# Patient Record
Sex: FEMALE | Race: WHITE | NOT HISPANIC OR LATINO | URBAN - METROPOLITAN AREA
[De-identification: names, ages, dates, MRNs, and addresses within clinical notes are randomized per-mention and may not be internally consistent; named-entity substitution may affect disease eponyms.]

---

## 2019-05-08 ENCOUNTER — AMBULATORY - HEALTHEAST (OUTPATIENT)
Dept: MULTI SPECIALTY CLINIC | Facility: CLINIC | Age: 64
End: 2019-05-08

## 2019-08-07 ENCOUNTER — AMBULATORY - HEALTHEAST (OUTPATIENT)
Dept: MULTI SPECIALTY CLINIC | Facility: CLINIC | Age: 64
End: 2019-08-07

## 2019-11-07 ENCOUNTER — OFFICE VISIT - HEALTHEAST (OUTPATIENT)
Dept: FAMILY MEDICINE | Facility: CLINIC | Age: 64
End: 2019-11-07

## 2019-11-07 DIAGNOSIS — E78.5 DYSLIPIDEMIA: ICD-10-CM

## 2019-11-07 DIAGNOSIS — E66.813 CLASS 3 SEVERE OBESITY DUE TO EXCESS CALORIES WITH SERIOUS COMORBIDITY AND BODY MASS INDEX (BMI) OF 40.0 TO 44.9 IN ADULT (H): ICD-10-CM

## 2019-11-07 DIAGNOSIS — Z13.29 SCREENING FOR THYROID DISORDER: ICD-10-CM

## 2019-11-07 DIAGNOSIS — E55.9 VITAMIN D DEFICIENCY: ICD-10-CM

## 2019-11-07 DIAGNOSIS — E88.810 METABOLIC SYNDROME: ICD-10-CM

## 2019-11-07 DIAGNOSIS — E66.01 CLASS 3 SEVERE OBESITY DUE TO EXCESS CALORIES WITH SERIOUS COMORBIDITY AND BODY MASS INDEX (BMI) OF 40.0 TO 44.9 IN ADULT (H): ICD-10-CM

## 2019-11-07 DIAGNOSIS — R73.01 ELEVATED FASTING GLUCOSE: ICD-10-CM

## 2019-11-07 DIAGNOSIS — G47.33 OSA (OBSTRUCTIVE SLEEP APNEA): ICD-10-CM

## 2019-11-07 LAB
ALBUMIN SERPL-MCNC: 4 G/DL (ref 3.5–5)
ALP SERPL-CCNC: 147 U/L (ref 45–120)
ALT SERPL W P-5'-P-CCNC: 21 U/L (ref 0–45)
ANION GAP SERPL CALCULATED.3IONS-SCNC: 12 MMOL/L (ref 5–18)
AST SERPL W P-5'-P-CCNC: 16 U/L (ref 0–40)
BILIRUB SERPL-MCNC: 0.6 MG/DL (ref 0–1)
BUN SERPL-MCNC: 12 MG/DL (ref 8–22)
CALCIUM SERPL-MCNC: 11.1 MG/DL (ref 8.5–10.5)
CHLORIDE BLD-SCNC: 105 MMOL/L (ref 98–107)
CHOLEST SERPL-MCNC: 249 MG/DL
CO2 SERPL-SCNC: 23 MMOL/L (ref 22–31)
CREAT SERPL-MCNC: 0.83 MG/DL (ref 0.6–1.1)
FASTING STATUS PATIENT QL REPORTED: YES
GFR SERPL CREATININE-BSD FRML MDRD: >60 ML/MIN/1.73M2
GLUCOSE BLD-MCNC: 89 MG/DL (ref 70–125)
HBA1C MFR BLD: 5.8 % (ref 3.5–6)
HDLC SERPL-MCNC: 59 MG/DL
LDLC SERPL CALC-MCNC: 147 MG/DL
POTASSIUM BLD-SCNC: 4.6 MMOL/L (ref 3.5–5)
PROT SERPL-MCNC: 7.3 G/DL (ref 6–8)
SODIUM SERPL-SCNC: 140 MMOL/L (ref 136–145)
TRIGL SERPL-MCNC: 214 MG/DL
TSH SERPL DL<=0.005 MIU/L-ACNC: 1.43 UIU/ML (ref 0.3–5)

## 2019-11-07 ASSESSMENT — MIFFLIN-ST. JEOR: SCORE: 1830.22

## 2019-11-07 NOTE — ASSESSMENT & PLAN NOTE
64 y.o. female in clinic today to discuss treatment of the following conditions through radical diet change, lifestyle modification and weight loss:  1. Metabolic syndrome    2. Class 3 severe obesity due to excess calories with serious comorbidity and body mass index (BMI) of 40.0 to 44.9 in adult (H)    3. FEROZ (obstructive sleep apnea)    4. Vitamin D deficiency    5. Elevated fasting glucose    6. Dyslipidemia    7. Screening for thyroid disorder      This patient has metabolic syndrome.  Today, we identified prediabetes.  She has physical exam characteristics consistent with hyperinsulinemia and her skin tags.  She has a prediabetic hemoglobin A 1C measurement today.  She is a long history of hypercholesterolemia.  She has centralobesity with a waist of in excess of 35 inches.  She is struggling with weight and appetite as well as energy.  Previously, she was able to lose weight over short periods of time through the use of anorexigenic medications.  These are contraindicated given her recent pulmonary embolism as well as the lack of efficacy in the past.  We discussed the dietary approach in which we will work to reduce her overall levels of insulin.  This willbe both a time restricted feeding pattern (18: 6?) approach as well as focusing on increasing her protein and fat consumption while decreasing her carbohydrate consumption.  I directed her to resources to help with nutrition.  She will meet with a dietitian next week.  She is pre-contemplative with respect to the 24-week conference of weight management program although thismay be what she needs to find success.  There is no absolute contra indication to other medications potentially efficacious in weight loss such as liraglutide but given her hopes to avoid medications will defer this conversation for now.    [place] for the follow-up appointment will include goals of therapy, definition of success andfocus on physical activity/exercise plan.

## 2019-11-08 LAB
25(OH)D3 SERPL-MCNC: 18.6 NG/ML (ref 30–80)
25(OH)D3 SERPL-MCNC: 18.6 NG/ML (ref 30–80)

## 2019-11-12 ENCOUNTER — OFFICE VISIT - HEALTHEAST (OUTPATIENT)
Dept: FAMILY MEDICINE | Facility: CLINIC | Age: 64
End: 2019-11-12

## 2019-11-12 DIAGNOSIS — Z71.3 NUTRITIONAL COUNSELING: ICD-10-CM

## 2019-11-12 DIAGNOSIS — E66.01 CLASS 3 SEVERE OBESITY DUE TO EXCESS CALORIES WITH SERIOUS COMORBIDITY AND BODY MASS INDEX (BMI) OF 40.0 TO 44.9 IN ADULT (H): ICD-10-CM

## 2019-11-12 DIAGNOSIS — E78.5 DYSLIPIDEMIA: ICD-10-CM

## 2019-11-12 DIAGNOSIS — E66.813 CLASS 3 SEVERE OBESITY DUE TO EXCESS CALORIES WITH SERIOUS COMORBIDITY AND BODY MASS INDEX (BMI) OF 40.0 TO 44.9 IN ADULT (H): ICD-10-CM

## 2019-11-12 ASSESSMENT — MIFFLIN-ST. JEOR: SCORE: 1811.17

## 2019-12-10 ENCOUNTER — OFFICE VISIT - HEALTHEAST (OUTPATIENT)
Dept: FAMILY MEDICINE | Facility: CLINIC | Age: 64
End: 2019-12-10

## 2019-12-10 DIAGNOSIS — E66.812 CLASS 2 SEVERE OBESITY DUE TO EXCESS CALORIES WITH SERIOUS COMORBIDITY AND BODY MASS INDEX (BMI) OF 38.0 TO 38.9 IN ADULT (H): ICD-10-CM

## 2019-12-10 DIAGNOSIS — E55.9 VITAMIN D DEFICIENCY: ICD-10-CM

## 2019-12-10 DIAGNOSIS — R73.03 PREDIABETES: ICD-10-CM

## 2019-12-10 DIAGNOSIS — E88.810 METABOLIC SYNDROME: ICD-10-CM

## 2019-12-10 DIAGNOSIS — E83.52 HYPERCALCEMIA: ICD-10-CM

## 2019-12-10 DIAGNOSIS — E66.01 CLASS 2 SEVERE OBESITY DUE TO EXCESS CALORIES WITH SERIOUS COMORBIDITY AND BODY MASS INDEX (BMI) OF 38.0 TO 38.9 IN ADULT (H): ICD-10-CM

## 2019-12-10 DIAGNOSIS — G47.33 OSA (OBSTRUCTIVE SLEEP APNEA): ICD-10-CM

## 2019-12-10 DIAGNOSIS — E78.5 DYSLIPIDEMIA: ICD-10-CM

## 2019-12-10 NOTE — ASSESSMENT & PLAN NOTE
64 y.o. year old female in clinic today to discuss treatment of the following conditions through diet and lifestyle modification and weight loss:   Metabolic syndrome    2. Class 2 severe obesity due to excess calories with serious comorbidity and body mass index (BMI) of 38.0 to 38.9 in adult (H)    3. FEROZ (obstructive sleep apnea)    4. Prediabetes    5. Vitamin D deficiency    6. Dyslipidemia    7. Hypercalcemia      The patient's weight loss result since the last visit was successful based on weight loss.  She feels great (energy, mood, decreased headaches).  She describes a healthy mindset with respect to eating.   - time restricted feeding.  Add variety of maricel of plan.   - ad ashley eating with goal of low carb.  (Numbers are a barrier).   - follow-up 4-8 weeks   - agree with referral to endocrinology.  Discussed hypercalcemia.

## 2020-01-29 ENCOUNTER — COMMUNICATION - HEALTHEAST (OUTPATIENT)
Dept: FAMILY MEDICINE | Facility: CLINIC | Age: 65
End: 2020-01-29

## 2020-01-29 DIAGNOSIS — E55.9 VITAMIN D DEFICIENCY: ICD-10-CM

## 2020-10-22 ENCOUNTER — COMMUNICATION - HEALTHEAST (OUTPATIENT)
Dept: FAMILY MEDICINE | Facility: CLINIC | Age: 65
End: 2020-10-22

## 2020-10-22 DIAGNOSIS — E55.9 VITAMIN D DEFICIENCY: ICD-10-CM

## 2021-05-28 ENCOUNTER — RECORDS - HEALTHEAST (OUTPATIENT)
Dept: ADMINISTRATIVE | Facility: CLINIC | Age: 66
End: 2021-05-28

## 2021-05-29 ENCOUNTER — RECORDS - HEALTHEAST (OUTPATIENT)
Dept: ADMINISTRATIVE | Facility: CLINIC | Age: 66
End: 2021-05-29

## 2021-05-30 ENCOUNTER — RECORDS - HEALTHEAST (OUTPATIENT)
Dept: ADMINISTRATIVE | Facility: CLINIC | Age: 66
End: 2021-05-30

## 2021-06-03 VITALS — HEIGHT: 69 IN | WEIGHT: 267.8 LBS | BODY MASS INDEX: 39.66 KG/M2

## 2021-06-03 VITALS
BODY MASS INDEX: 40.29 KG/M2 | SYSTOLIC BLOOD PRESSURE: 134 MMHG | HEART RATE: 84 BPM | HEIGHT: 69 IN | WEIGHT: 272 LBS | DIASTOLIC BLOOD PRESSURE: 84 MMHG

## 2021-06-03 NOTE — PROGRESS NOTES
"  PATIENT:  Анна Delgado  MRN:         [id number]  :         1955  AAKASH:         2019    Dear aTsha Crouch MD,    I had the pleasure of seeing your patient, Анна Delgado.  Full intake/assessment done to determine barriers to weight loss success and develop a treatment plan.  Анна Delgado is a 64 y.o. female interested in treatment of medical problems associated with weight.  Her weight today is Weight: (!) 272 lb (123.4 kg) (2019  8:22 AM), Body mass index is 40.76 kg/m ., and she has the following co-morbidities:  No flowsheet data found.    Patient goals reviewed with patient 2019   I am interested in attaining a healthier weight to diminish current health problems related to co-morbid conditions: Yes   I am interested in attaining a healthier weight in order to prevent future health problems: Yes   Are you pursing bariatric surgery? No   Are you pursuing transplant surgery? No       Referring Provider 2019   Please name the provider who referred you to Medical Weight Management.  If you do not know, please answer: \"I Don't Know\". I don't know       Wt Readings from Last 4 Encounters:   19 (!) 272 lb (123.4 kg)   09/18/15 (!) 260 lb 1 oz (118 kg)   04/15/15 (!) 254 lb (115.2 kg)   03/12/15 (!) 270 lb (122.5 kg)     History:    She is looking to get back on track.  In , she had some success.  She was aware of what she was eating.  Looking to lose weight.  Wants to be in control of eating.  Parents were ill.      Currently she eats ad ashley.  She has fast food or leftovers.  \"Out and about.\"  She says that she used to make supper.  \"It requires planning.\"  Weight gain with pregnancy.  More weight gain in the past 10 years.      Hunger: \"rarely.\"  She does have cravings.  \"I eat out of boredom or anxiety.\" Breakfast is 9am.  Last meal is generally after her  gets home. 6-8:30.  She does not work out of the home. She has been retired for ~18 months.  She worked as " .         Weight History Reviewed With Patient 11/6/2019   How concerned are you about your weight? Very Concerned   Would you describe your weight gain as gradual? Yes   The following factors have contributed to my weight gain:  Eating Wrong Types of Food, Eating Too Much, Lack of Exercise   The most weight I have ever lost was: (lbs) 35   My lowest weight since age 18 was: 135   My highest weight since age 18 was: 283   I have the following family history of obesity/being overweight:  My mother is overweight, One or more of my siblings are overweight   Has anyone in your family had weight loss surgery? No       Diet Recall Reviewed With Patient 11/6/2019   Do you typically eat breakfast? Yes   Do you typically eat lunch? Yes   Do you typically eat supper? Yes   Do you typically eat snacks? Yes   How many glasses of juice do you drink in a typical day? 0   How many of glasses of milk do you drink in a typical day? 4   If you do drink milk, what type? Whole   How many 8oz glasses of sugar containing drinks such as Nikita-Aid/sweet tea do you drink in a day? 0   How many cans/bottles of sugar pop/soda/tea/sports drinks do you drink in a day? 1   How many cans/bottles of sugar pop/soda/tea/sports drinks do you drink in a day? 1   How often do you have a drink of alcohol? [place] or Less   If you do drink, how many drinks might you have in a day? 1 or 2       Eating Habits Reviewed With Patient 11/6/2019   Generally, my meals include foods like these: bread, pasta, rice, potatoes, corn, crackers, sweet dessert, pop, or juice. Almost Everyday   Generally, my meals include foods like these: fried meats, brats, burgers, french fries, pizza, cheese, chips, or ice cream. A Few Times a Week   Eat fast food (like [employer]onal[employer], [employer], [employer]aco Bell). A Few Times a Week   Eat at a buffet or sit-down restaurant. Never   Eat most of my meals in front of the TV or computer. Never   Often skip meals, eat at random times, have  no regular eating times. Never   Rarely sit down for a meal but snack or graze throughout.  Never   Eat extra snacks between meals. A Few Times a Week   Eat most of my food at the end of the day. Never   Eat in the middle of the night or wake up at night to eat. Never   Eat extra snacks to prevent or correct low blood sugar. Never   Eat to prevent acid reflux or stomach pain. Never   Worry about not having enough food to eat. Never   Have you been to the food shelf at least a few times this year? No   I eat when I am depressed, stressed, anxious, or bored. Half of the Week   I eat when I am happy or as a reward. Half of the Week   I feel hungry all the time even if I just have eaten. Never   Feeling full is important to me. Never   Once I start eating, it is hard to stop. A Few Times a Week   I finish all the food on my plate even if I am already full. Almost Every Day   I can't resist eating delicious food or walk past the good food/smell. A Few Times a Week   I eat/snack without noticing that I am eating. Half of the Week   I eat when I am preparing the meal. A Few Times a Week   I eat more than usual when I see others eating. A Few Times a Week   I have trouble not eating sweets, ice cream, cookies, or chips if they are around the house. Almost Every Day   I think about food all day. Almost Every Day   What foods, if any, do you crave? Sweets / Candy / Chocolate   Please list any other foods you crave. Chocolate Milk, James's   I feel out of control when eating. Weekly   I eat a large amount of food, like a loaf of bread, a box of cookies, a pint/quart of ice cream, all at once. Never   I eat a large amount of food even when I am not hungry. Weekly   I eat alone because I feel embarrassed and do not want others to see how much I have eaten. Never   I eat until I am uncomfortably full.  Weekly   I feel bad, disgusted, or guilty after I overeat. Almost Every Day   I make myself vomit what I have eaten or use  laxatives to get rid of food. Never       Activity/Exercise History Reviewed With Patient 11/6/2019   How much of a typical 12 hour day do you spend sitting? Most of the Day   How much of a typical 12 hour day do you spend lying down? Less Than Half the Day   How much of a typical day do you spend walking/standing? Less Than Half the Day   How many hours (not including work) do you spend on the TV/Video Games/Computer/Tablet/Phone? 4-5 Hours   How many times a week are you active for the purpose of exercise? Never   How many total minutes do you spend doing some activity for the purpose of exercising when you exercise? None   What keeps you from being more active? Other       PAST MEDICAL HISTORY:  No past medical history on file.    Work/Social History Reviewed With Patient 11/6/2019   My employment status is: Retired   My job is: retired   How much of your job is spent on the computer or phone? Less than 50%   What is your marital status?  / In a Relationship   If in a relationship, is your significant other overweight? Yes   Do you have children? Yes   If you have children, are they overweight? No       Mental Health History Reviewed With Patient 11/6/2019   Have you ever been physically or sexually abused? No   How often in the past 2 weeks have you felt little interest or pleasure in doing things? For Several Days   Over the past 2 weeks how often have you felt down, depressed, or hopeless? For Several Days       Sleep History Reviewed With Patient 11/6/2019   How many hours do you sleep at night? 9   Do you think that you snore loudly or has anybody ever heard you snore loudly (louder than talking or so loud it can be heard behind a shut door)? No   Has anyone seen or heard you stop breathing during your sleep? No   Do you often feel tired, fatigued, or sleepy during the day? No       MEDICATIONS:   Current Outpatient Medications   Medication Sig Dispense Refill     rivaroxaban (XARELTO) 15 mg tablet  Take 15 mg by mouth 2 (two) times a day with meals.              SUMAtriptan (IMITREX) 100 MG tablet Take 100 mg by mouth every 2 (two) hours as needed.              No current facility-administered medications for this visit.        ALLERGIES:   No Known Allergies    PHYSICAL EXAM:  Gen: Alert, pleasant, cooperative, no distress, appears stated age, patient is obese.  The patient hasandroid body morphology.  Eyes: No conjunctival injection, no scleral icterus.  Neck: Supple, symmetrical, trachea midline.  No adenopathy.  Thyroid is without enlargement/tenderness/nodules.  Cardiac: Regular rate and rhythm, normal S1/S2, no murmurs or gallops, no edema at ankles bilaterally.  Respiratory: Clear to auscultation bilaterally.  Abdomen: Soft, nontender, no hepatosplenomegaly.  Extremities: Warm, well-perfused, no edema.     Skin: Skin, turgor, texture color is normal. Skin tags: Yes, striae: Yes, hirsutism: no, acanthosis nigricans: No.  Neurologic: Cranial nerves II through XII grossly intact.  2+ reflexes at the patella bilaterally.  Psych: Normal affect.  Normal rate of speech.  No tangentiality.      ASSESSMENT/PLAN:    Metabolic syndrome  64 y.o. female in clinic today to discuss treatment of the following conditions through radical diet change, lifestyle modification and weight loss:  1. Metabolic syndrome    2. Class 3 severe obesity due to excess calories with serious comorbidity and body mass index (BMI) of 40.0 to 44.9 in adult (H)    3. FEROZ (obstructive sleep apnea)    4. Vitamin D deficiency    5. Elevated fasting glucose    6. Dyslipidemia    7. Screening for thyroid disorder      This patient has metabolic syndrome.  Today, we identified prediabetes.  She has physical exam characteristics consistent with hyperinsulinemia and her skin tags.  She has a prediabetic hemoglobin A 1C measurement today.  She is a long history of hypercholesterolemia.  She has central obesity with a waist of in excess of 35 inches.   She is struggling with weight and appetite as well as energy.  Previously, she was able to lose weight over short periods of time through the use of anorexigenic medications.  These are contraindicated given her recent pulmonary embolism as well as the lack of efficacy in the past.  We discussed the dietary approach in which we will work to reduce her overall levels of insulin.  This will be both a time restricted feeding pattern (18: 6?) approach as well as focusing on increasing her protein and fat consumption while decreasing her carbohydrate consumption.  I directed her to resources to help with nutrition.  She will meet with a dietitian next week.  She is pre-contemplative with respect to the 24-week conference of weight management program although this may be what she needs to find success.  There is no absolute contra indication to other medications potentially efficacious in weight loss such as liraglutide but given her hopes to avoid medications will defer this conversation for now.    [place] for the follow-up appointment will include goals of therapy, definition of success and focus on physical activity/exercise plan.    RTC:    4 weeks.    TIME: 55 min spent on evaluation, management, counseling, education, & motivational interviewing with greater than 50 % of the total time was spent on counseling and coordinating care    Sincerely,    Kirill Wong MD

## 2021-06-03 NOTE — PROGRESS NOTES
Medical  Weight Loss Initial Diet Evaluation  Анна is presenting today for a new weight management nutrition consultation. Pt has had an initial appointment with Dr. Wong   Weight loss medication: none .   Weight Loss Goal: to be below 200 lbs   Nutrition Assessment:   Anthropometrics:  Pt's Initial Weight: 272 lbs  Weight: (!) 267 lb 12.8 oz (121.5 kg)  Weight loss from initial: 4.2  % Weight loss: 1.54 %  Body Fat %: 49.7  Waist Circumference: 51.5 inches  Neck Circumference: 17 inches    BMI:   Vitals:    11/12/19 0936   Weight: (!) 267 lb 12.8 oz (121.5 kg)      IBW: 145-155 lbs   Estimated RMR (Jerad Aguilar equation): 1826 kcals/day    Recommended Protein Intake: 60-80 grams of protein/day  Medical History:  Patient Active Problem List   Diagnosis     FEROZ (obstructive sleep apnea)     Obesity due to excess calories with serious comorbidity     Migraine headache     Vitamin D deficiency     Acute pulmonary embolism without acute cor pulmonale (H)     History of breast cancer     Major depression     Metabolic syndrome     Prediabetes     Dyslipidemia     Nutrition History:   Food allergies: No   Food intolerances/aversions: No    Vitamins/Mineral Supplementation: Vitamin D  Dietary Recall:  Breakfast: toast or bowl of cereal (dry) or oatmeal, big glass of milk, banana; now switched things up and is doing intermittent fasting   Snack:none   Lunch:12-2 pm; salads or egg salad or leftover pork chops   Snack: nothing or walnuts or cashews or piece of cheese   Dinner:salad, meat   Snack: none (usually starts fast around 7:30 pm)   Overnight eating: No  Eating out (frequency/week): 1-2 times/week   Hydration (type/oz. per day):  Water: at least 64 oz/day   Alcohol : none   Milk: occasional glass of milk   Exercise:  Routine exercise established: No  Would like to start walking    Nutrition Diagnosis (PES statement):   1. Overweight/Obesity (NC 3.3) related to overeating and poor lifestyle habits as evidenced by  patient's subjective statements (history of excessive energy intake, eating out frequently, lack of physical activity) and BMI of 40.13 kg/m2.   Nutrition Intervention:  1. Food and/or Nutrient Delivery   a. Placed emphasis on importance of developing a healthy meal routine, aiming for 2 meals a day and 1 high protein snack (intermittent fasting).   2. Nutrition Education   a. Discussed with patient how to build a meal: the importance of including a lean/low fat protein at each meal, include a source of vegetables at a minimum of lunch and dinner and limiting carbohydrate intake to 1 serving per meal.  b. Educated on sources of lean protein, portion sizes, the amount of grams found in each source. Recommend patient to aim for 20-30g protein at each meal.  3. Nutrition Counseling   a. Encouraged importance of developing routine exercise for health benefits and weight loss.  Goals established by patient:   1. Consume 1 protein food source at each meal.   2. Establish an exercise regimen of walking.   Handouts provided:  Plate Method  List of Lean Protein Sources  Nutrition Monitoring/Evaluation:   Follow up:  Pt will follow up in 1 month(s) with Dr Wong and prn with dietitian.   Time spent with patient: 30 minutes  Dana Rojo RD   ABN: Yes

## 2021-06-03 NOTE — PATIENT INSTRUCTIONS - HE
Keys to Success    Minimum 3? meals per day, control daily calories   - 1200? female   - 1600 male  Minimum of 4 palm servings of protein daily.  Begin everyday with protein    - ~80 gm for female   - 120 gm for male  Be mindful of net carbs  50-75 gm/day   - (Total carbs - fiber)   - Less than 5 gm of carbs with breakfast    Supplementation   - 1 multivitamin    - clear and copious urination (adequate water consumption)   - 2000 mg fish oil capsules    - 2000 iu Vitamin D (I will be checking your level today and may send a prescription to your pharmacy if necessary)    _______________________________    Dr. Ángel Lopez MD   - The Obesity Code   - YouTube    Dr. Ez Holman MD   - Always Hungry    Dr. Ngoc Brwon, DO.   Search for her name in Youtube with added criteria: Steven Crowell, PhD   - Why We Sleep

## 2021-06-04 VITALS
BODY MASS INDEX: 38.99 KG/M2 | HEART RATE: 74 BPM | DIASTOLIC BLOOD PRESSURE: 80 MMHG | WEIGHT: 260.2 LBS | SYSTOLIC BLOOD PRESSURE: 134 MMHG

## 2021-06-04 NOTE — PROGRESS NOTES
"Assessment and Plan:     Metabolic syndrome  64 y.o. year old female in clinic today to discuss treatment of the following conditions through diet and lifestyle modification and weight loss:  1. Metabolic syndrome    2. Class 2 severe obesity due to excess calories with serious comorbidity and body mass index (BMI) of 38.0 to 38.9 in adult (H)    3. FEROZ (obstructive sleep apnea)    4. Prediabetes    5. Vitamin D deficiency    6. Dyslipidemia    7. Hypercalcemia      The patient's weight loss result since the last visit was successful based on weight loss.  She feels great (energy, mood, decreased headaches).  She describes a healthy mindset with respect to eating.   - time restricted feeding.  Add variety of maricel of plan.   - ad ashley eating with goal of low carb.  (Numbers are a barrier).   - follow-up 4-8 weeks   - agree with referral to endocrinology.  Discussed hypercalcemia.     Continue supplements.    Recommend increasing movement throughout the day--sitting less, moving more.  Will increase activity over time to reach a goal of at least 150 minutes of moderate exercise each week.  Behavior modification:  Cognitive restructuring exercises, journaling stressors, triggers for food cravings.  Dietary Intervention:  Reduced calorie, reduced carbohydrates, whole food diet.  Greater than 50% of this 15 minute visit was spent in counseling regarding patient's obesity, medications, dietary, exercise, and behavior modification recommendations.    Subjective  Patient presents for treatment of chronic, comorbid conditions using intensive therapeutic lifestyle interventions including nutrition, physical activity, and behavior management.   - successes: \"the whole thing.\"  She finds her current strategy helpful.  Mental attitude: \"I feel great.\"  Mood is better.  No dark thoughts. More kelin.    - headaches: improved.   - struggles: none identified.  Occasional cravings.     - exercise plan: walking more ([place] in " [place]    - tracking/journaling: Ad ashley.  No writing.  No breakfast.  \"Great.\"  She eats a thoughtful protein based lunch.  Some whole fruit.  Less milk craving.  Dinner is \"regular.\"     -  [place]   - hunger: controlled   - medication side effects: NA  - hematology recommended endocrinology evaluation.     - Barriers to losing weight:  Behavior:  inadequate exercise    Patient Active Problem List   Diagnosis     FEROZ (obstructive sleep apnea)     Obesity due to excess calories with serious comorbidity     Migraine headache     Vitamin D deficiency     Acute pulmonary embolism without acute cor pulmonale (H)     History of breast cancer     Major depression     Metabolic syndrome     Prediabetes     Dyslipidemia     Hypercalcemia       Current Outpatient Medications on File Prior to Visit   Medication Sig Dispense Refill     rivaroxaban (XARELTO) 15 mg tablet Take 15 mg by mouth 2 (two) times a day with meals.              SUMAtriptan (IMITREX) 100 MG tablet Take 100 mg by mouth every 2 (two) hours as needed.              [DISCONTINUED] ergocalciferol (ERGOCALCIFEROL) 50,000 unit capsule Take 1 capsule (50,000 Units total) by mouth once a week for 12 doses. 12 capsule 0     No current facility-administered medications on file prior to visit.        Objective:  Vitals:    12/10/19 0935   BP: 134/80   Pulse: 74     Initial Weight: 272 lbs  Weight: (!) 260 lb 3.2 oz (118 kg)  Weight loss from initial: 11.8  % Weight loss: 4.34 %    Body mass index is 38.99 kg/m .  No LMP recorded. Patient is postmenopausal.  General:  Patient is alert, pleasant, no distress.  Patient is obese.    This note has been dictated using voice recognition software. Any grammatical or context distortions are unintentional and inherent to the software  "

## 2021-06-05 NOTE — TELEPHONE ENCOUNTER
Not on med list. Gets filled [facility]    Janeth Torres, RN [facility] Triage Nurse Advisor

## 2021-06-12 NOTE — TELEPHONE ENCOUNTER
RN cannot approve Refill Request    RN can NOT refill this medication med is not covered by policy/route to provider. Last office visit: 12/10/2019 Kirill Wong MD Last Physical: Visit date not found Last MTM visit: Visit date not found Last visit same specialty: 12/10/2019 Kirill Wong MD.  Next visit within 3 mo: Visit date not found  Next physical within 3 mo: Visit date not found      Marva Cameron, [facility] Triage/Med Refill 10/22/2020    Requested Prescriptions   Pending Prescriptions Disp Refills     ergocalciferol (ERGOCALCIFEROL) 1,250 mcg (50,000 unit) capsule [Pharmacy Med Name: VITAMIN D2 1.25MG(50,000 UNIT)] 12 capsule 0     Sig: TAKE 1 CAPSULE (50,000 UNITS TOTAL) BY MOUTH ONCE A WEEK FOR 12 DOSES.       There is no refill protocol information for this order

## 2021-06-16 PROBLEM — E88.810 METABOLIC SYNDROME: Status: ACTIVE | Noted: 2019-11-07

## 2021-06-16 PROBLEM — E78.5 DYSLIPIDEMIA: Status: ACTIVE | Noted: 2019-11-07

## 2021-06-16 PROBLEM — I26.99 ACUTE PULMONARY EMBOLISM WITHOUT ACUTE COR PULMONALE (H): Status: ACTIVE | Noted: 2019-10-22

## 2021-06-16 PROBLEM — E83.52 HYPERCALCEMIA: Status: ACTIVE | Noted: 2019-12-10

## 2021-06-16 PROBLEM — R73.03 PREDIABETES: Status: ACTIVE | Noted: 2019-11-07

## 2021-07-03 NOTE — ADDENDUM NOTE
Addendum Note by Kirill Huerta MD at 11/7/2019  8:20 AM     Author: Kirill Huerta MD Service: -- Author Type: Physician    Filed: 11/8/2019 12:51 PM Encounter Date: 11/7/2019 Status: Signed    : Kirill Huerta MD (Physician)    Addended by: KIRILL HUERTA on: 11/8/2019 12:51 PM        Modules accepted: Orders

## 2021-08-21 ENCOUNTER — HEALTH MAINTENANCE LETTER (OUTPATIENT)
Age: 66
End: 2021-08-21

## 2021-10-16 ENCOUNTER — HEALTH MAINTENANCE LETTER (OUTPATIENT)
Age: 66
End: 2021-10-16

## 2022-10-01 ENCOUNTER — HEALTH MAINTENANCE LETTER (OUTPATIENT)
Age: 67
End: 2022-10-01

## 2023-10-15 ENCOUNTER — HEALTH MAINTENANCE LETTER (OUTPATIENT)
Age: 68
End: 2023-10-15

## 2023-12-24 ENCOUNTER — HEALTH MAINTENANCE LETTER (OUTPATIENT)
Age: 68
End: 2023-12-24